# Patient Record
Sex: FEMALE | Race: WHITE
[De-identification: names, ages, dates, MRNs, and addresses within clinical notes are randomized per-mention and may not be internally consistent; named-entity substitution may affect disease eponyms.]

---

## 2018-12-20 ENCOUNTER — HOSPITAL ENCOUNTER (OUTPATIENT)
Dept: HOSPITAL 100 - SDC | Age: 28
Discharge: HOME | End: 2018-12-20
Payer: MEDICAID

## 2018-12-20 VITALS
RESPIRATION RATE: 14 BRPM | OXYGEN SATURATION: 100 % | DIASTOLIC BLOOD PRESSURE: 58 MMHG | TEMPERATURE: 100.22 F | HEART RATE: 83 BPM | SYSTOLIC BLOOD PRESSURE: 99 MMHG

## 2018-12-20 VITALS
TEMPERATURE: 97.52 F | DIASTOLIC BLOOD PRESSURE: 58 MMHG | OXYGEN SATURATION: 100 % | HEART RATE: 63 BPM | SYSTOLIC BLOOD PRESSURE: 99 MMHG | RESPIRATION RATE: 16 BRPM

## 2018-12-20 VITALS
HEART RATE: 50 BPM | TEMPERATURE: 98.6 F | DIASTOLIC BLOOD PRESSURE: 65 MMHG | OXYGEN SATURATION: 100 % | RESPIRATION RATE: 16 BRPM | SYSTOLIC BLOOD PRESSURE: 96 MMHG

## 2018-12-20 VITALS
SYSTOLIC BLOOD PRESSURE: 102 MMHG | OXYGEN SATURATION: 100 % | RESPIRATION RATE: 16 BRPM | DIASTOLIC BLOOD PRESSURE: 58 MMHG | HEART RATE: 68 BPM

## 2018-12-20 VITALS — DIASTOLIC BLOOD PRESSURE: 58 MMHG | SYSTOLIC BLOOD PRESSURE: 99 MMHG

## 2018-12-20 VITALS — BODY MASS INDEX: 22.3 KG/M2

## 2018-12-20 VITALS
OXYGEN SATURATION: 100 % | SYSTOLIC BLOOD PRESSURE: 97 MMHG | RESPIRATION RATE: 16 BRPM | HEART RATE: 54 BPM | DIASTOLIC BLOOD PRESSURE: 61 MMHG

## 2018-12-20 DIAGNOSIS — F17.200: ICD-10-CM

## 2018-12-20 DIAGNOSIS — Z3A.01: ICD-10-CM

## 2018-12-20 DIAGNOSIS — O02.1: Primary | ICD-10-CM

## 2018-12-20 DIAGNOSIS — D50.9: ICD-10-CM

## 2018-12-20 LAB
DEPRECATED RDW RBC: 40.8 FL (ref 35.1–43.9)
ERYTHROCYTE [DISTWIDTH] IN BLOOD: 12.1 % (ref 11.6–14.6)
HCT VFR BLD AUTO: 39.7 % (ref 37–47)
HEMOGLOBIN: 13.3 G/DL (ref 12–15)
HGB BLD-MCNC: 13.3 G/DL (ref 12–15)
MCV RBC: 92.8 FL (ref 81–99)
MEAN CORP HGB CONC: 33.5 G/GL (ref 32–36)
MEAN PLATELET VOL.: 11.5 FL (ref 6.2–12)
PLATELET # BLD: 140 K/MM3 (ref 150–450)
PLATELET COUNT: 140 K/MM3 (ref 150–450)
RBC # BLD AUTO: 4.28 M/MM3 (ref 4.2–5.4)
RBC DISTRIBUTION WIDTH CV: 12.1 % (ref 11.6–14.6)
RBC DISTRIBUTION WIDTH SD: 40.8 FL (ref 35.1–43.9)
SCAN INDICATED ON CBC? Y/N: NO
WBC # BLD AUTO: 5.3 K/MM3 (ref 4.4–11)
WHITE BLOOD COUNT: 5.3 K/MM3 (ref 4.4–11)

## 2018-12-20 PROCEDURE — 88305 TISSUE EXAM BY PATHOLOGIST: CPT

## 2018-12-20 PROCEDURE — 86850 RBC ANTIBODY SCREEN: CPT

## 2018-12-20 PROCEDURE — 85027 COMPLETE CBC AUTOMATED: CPT

## 2018-12-20 PROCEDURE — 86900 BLOOD TYPING SEROLOGIC ABO: CPT

## 2018-12-20 PROCEDURE — 59820 CARE OF MISCARRIAGE: CPT

## 2019-03-22 ENCOUNTER — HOSPITAL ENCOUNTER (OUTPATIENT)
Dept: HOSPITAL 100 - SDC | Age: 29
Discharge: HOME | End: 2019-03-22
Payer: MEDICAID

## 2019-03-22 VITALS
SYSTOLIC BLOOD PRESSURE: 102 MMHG | RESPIRATION RATE: 18 BRPM | OXYGEN SATURATION: 100 % | DIASTOLIC BLOOD PRESSURE: 57 MMHG | HEART RATE: 57 BPM

## 2019-03-22 VITALS
HEART RATE: 68 BPM | SYSTOLIC BLOOD PRESSURE: 102 MMHG | DIASTOLIC BLOOD PRESSURE: 68 MMHG | RESPIRATION RATE: 16 BRPM | TEMPERATURE: 98.96 F | OXYGEN SATURATION: 100 %

## 2019-03-22 VITALS
RESPIRATION RATE: 18 BRPM | OXYGEN SATURATION: 100 % | HEART RATE: 53 BPM | DIASTOLIC BLOOD PRESSURE: 68 MMHG | SYSTOLIC BLOOD PRESSURE: 102 MMHG | TEMPERATURE: 97.6 F

## 2019-03-22 VITALS
DIASTOLIC BLOOD PRESSURE: 68 MMHG | SYSTOLIC BLOOD PRESSURE: 93 MMHG | RESPIRATION RATE: 16 BRPM | TEMPERATURE: 99.14 F | OXYGEN SATURATION: 100 % | HEART RATE: 54 BPM

## 2019-03-22 VITALS
DIASTOLIC BLOOD PRESSURE: 68 MMHG | HEART RATE: 54 BPM | OXYGEN SATURATION: 100 % | SYSTOLIC BLOOD PRESSURE: 102 MMHG | TEMPERATURE: 98.96 F | RESPIRATION RATE: 16 BRPM

## 2019-03-22 VITALS
RESPIRATION RATE: 18 BRPM | DIASTOLIC BLOOD PRESSURE: 68 MMHG | HEART RATE: 59 BPM | OXYGEN SATURATION: 100 % | SYSTOLIC BLOOD PRESSURE: 102 MMHG

## 2019-03-22 VITALS — BODY MASS INDEX: 22.2 KG/M2 | BODY MASS INDEX: 22.3 KG/M2

## 2019-03-22 VITALS
DIASTOLIC BLOOD PRESSURE: 68 MMHG | RESPIRATION RATE: 18 BRPM | SYSTOLIC BLOOD PRESSURE: 98 MMHG | OXYGEN SATURATION: 100 % | HEART RATE: 55 BPM

## 2019-03-22 DIAGNOSIS — N93.9: Primary | ICD-10-CM

## 2019-03-22 DIAGNOSIS — D64.9: ICD-10-CM

## 2019-03-22 DIAGNOSIS — F17.210: ICD-10-CM

## 2019-03-22 LAB — INTERNAL QC VALIDATED?: (no result)

## 2019-03-22 PROCEDURE — 0UDB8ZZ EXTRACTION OF ENDOMETRIUM, VIA NATURAL OR ARTIFICIAL OPENING ENDOSCOPIC: ICD-10-PCS | Performed by: OBSTETRICS & GYNECOLOGY

## 2019-03-22 PROCEDURE — 88305 TISSUE EXAM BY PATHOLOGIST: CPT

## 2019-03-22 PROCEDURE — 58558 HYSTEROSCOPY BIOPSY: CPT

## 2019-03-22 PROCEDURE — 81025 URINE PREGNANCY TEST: CPT

## 2019-03-22 RX ADMIN — HYDROCODONE BITARTRATE AND ACETAMINOPHEN 0 TABLET: 5; 325 TABLET ORAL at 08:40

## 2019-10-07 ENCOUNTER — HOSPITAL ENCOUNTER (OUTPATIENT)
Age: 29
End: 2019-10-07
Payer: MEDICAID

## 2019-10-07 VITALS — BODY MASS INDEX: 22.2 KG/M2

## 2019-10-07 DIAGNOSIS — Z31.430: Primary | ICD-10-CM

## 2019-10-07 PROCEDURE — 36415 COLL VENOUS BLD VENIPUNCTURE: CPT

## 2020-05-19 ENCOUNTER — HOSPITAL ENCOUNTER (OUTPATIENT)
Dept: HOSPITAL 100 - WPOUT | Age: 30
Discharge: HOME | End: 2020-05-19
Payer: MEDICAID

## 2020-05-19 VITALS — SYSTOLIC BLOOD PRESSURE: 109 MMHG | HEART RATE: 78 BPM | TEMPERATURE: 96.98 F | DIASTOLIC BLOOD PRESSURE: 66 MMHG

## 2020-05-19 VITALS — DIASTOLIC BLOOD PRESSURE: 61 MMHG | TEMPERATURE: 96.98 F | SYSTOLIC BLOOD PRESSURE: 98 MMHG | HEART RATE: 73 BPM

## 2020-05-19 VITALS — BODY MASS INDEX: 22.2 KG/M2 | BODY MASS INDEX: 25.5 KG/M2

## 2020-05-19 VITALS — DIASTOLIC BLOOD PRESSURE: 54 MMHG | SYSTOLIC BLOOD PRESSURE: 100 MMHG | HEART RATE: 63 BPM

## 2020-05-19 VITALS — TEMPERATURE: 96.8 F

## 2020-05-19 DIAGNOSIS — O47.1: Primary | ICD-10-CM

## 2020-05-19 DIAGNOSIS — Z3A.39: ICD-10-CM

## 2020-05-19 PROCEDURE — G0378 HOSPITAL OBSERVATION PER HR: HCPCS

## 2020-05-19 PROCEDURE — 59050 FETAL MONITOR W/REPORT: CPT

## 2020-05-19 PROCEDURE — 59025 FETAL NON-STRESS TEST: CPT

## 2020-05-19 PROCEDURE — 99218: CPT

## 2020-05-23 ENCOUNTER — HOSPITAL ENCOUNTER (INPATIENT)
Age: 30
LOS: 1 days | Discharge: HOME | DRG: 560 | End: 2020-05-24
Payer: MEDICAID

## 2020-05-23 VITALS
TEMPERATURE: 98.7 F | HEART RATE: 60 BPM | SYSTOLIC BLOOD PRESSURE: 109 MMHG | OXYGEN SATURATION: 98 % | DIASTOLIC BLOOD PRESSURE: 60 MMHG

## 2020-05-23 VITALS — TEMPERATURE: 98.06 F | OXYGEN SATURATION: 99 %

## 2020-05-23 VITALS — SYSTOLIC BLOOD PRESSURE: 109 MMHG | HEART RATE: 71 BPM | DIASTOLIC BLOOD PRESSURE: 66 MMHG

## 2020-05-23 VITALS — BODY MASS INDEX: 25.5 KG/M2 | BODY MASS INDEX: 25.4 KG/M2

## 2020-05-23 VITALS — HEART RATE: 65 BPM | OXYGEN SATURATION: 99 % | DIASTOLIC BLOOD PRESSURE: 57 MMHG | SYSTOLIC BLOOD PRESSURE: 105 MMHG

## 2020-05-23 VITALS
HEART RATE: 61 BPM | OXYGEN SATURATION: 99 % | TEMPERATURE: 98.96 F | SYSTOLIC BLOOD PRESSURE: 108 MMHG | RESPIRATION RATE: 16 BRPM | DIASTOLIC BLOOD PRESSURE: 60 MMHG

## 2020-05-23 VITALS — SYSTOLIC BLOOD PRESSURE: 112 MMHG | DIASTOLIC BLOOD PRESSURE: 67 MMHG | HEART RATE: 70 BPM

## 2020-05-23 VITALS — OXYGEN SATURATION: 96 % | TEMPERATURE: 98 F

## 2020-05-23 VITALS — DIASTOLIC BLOOD PRESSURE: 65 MMHG | HEART RATE: 64 BPM | SYSTOLIC BLOOD PRESSURE: 120 MMHG

## 2020-05-23 VITALS
RESPIRATION RATE: 14 BRPM | TEMPERATURE: 97.16 F | HEART RATE: 58 BPM | SYSTOLIC BLOOD PRESSURE: 103 MMHG | DIASTOLIC BLOOD PRESSURE: 53 MMHG

## 2020-05-23 VITALS
OXYGEN SATURATION: 99 % | TEMPERATURE: 98.5 F | DIASTOLIC BLOOD PRESSURE: 55 MMHG | SYSTOLIC BLOOD PRESSURE: 99 MMHG | HEART RATE: 64 BPM | RESPIRATION RATE: 16 BRPM

## 2020-05-23 VITALS
TEMPERATURE: 98.42 F | OXYGEN SATURATION: 99 % | DIASTOLIC BLOOD PRESSURE: 60 MMHG | SYSTOLIC BLOOD PRESSURE: 102 MMHG | HEART RATE: 51 BPM

## 2020-05-23 VITALS — DIASTOLIC BLOOD PRESSURE: 61 MMHG | SYSTOLIC BLOOD PRESSURE: 107 MMHG | HEART RATE: 75 BPM

## 2020-05-23 VITALS
DIASTOLIC BLOOD PRESSURE: 56 MMHG | TEMPERATURE: 97.6 F | SYSTOLIC BLOOD PRESSURE: 94 MMHG | HEART RATE: 51 BPM | RESPIRATION RATE: 16 BRPM

## 2020-05-23 VITALS — TEMPERATURE: 98.42 F | SYSTOLIC BLOOD PRESSURE: 112 MMHG | HEART RATE: 65 BPM | DIASTOLIC BLOOD PRESSURE: 73 MMHG

## 2020-05-23 VITALS — HEART RATE: 61 BPM | SYSTOLIC BLOOD PRESSURE: 108 MMHG | DIASTOLIC BLOOD PRESSURE: 60 MMHG

## 2020-05-23 VITALS — HEART RATE: 77 BPM | DIASTOLIC BLOOD PRESSURE: 69 MMHG | SYSTOLIC BLOOD PRESSURE: 109 MMHG

## 2020-05-23 VITALS — DIASTOLIC BLOOD PRESSURE: 63 MMHG | SYSTOLIC BLOOD PRESSURE: 110 MMHG | OXYGEN SATURATION: 98 % | HEART RATE: 48 BPM

## 2020-05-23 VITALS — DIASTOLIC BLOOD PRESSURE: 74 MMHG | SYSTOLIC BLOOD PRESSURE: 118 MMHG | TEMPERATURE: 98.42 F | HEART RATE: 54 BPM

## 2020-05-23 DIAGNOSIS — F17.200: ICD-10-CM

## 2020-05-23 DIAGNOSIS — Z3A.40: ICD-10-CM

## 2020-05-23 LAB
DEPRECATED RDW RBC: 45.1 FL (ref 35.1–43.9)
DIFFERENTIAL INDICATED: (no result)
ERYTHROCYTE [DISTWIDTH] IN BLOOD: 12.5 % (ref 11.6–14.6)
HCT VFR BLD AUTO: 36.3 % (ref 37–47)
HEMOGLOBIN: 12.1 G/DL (ref 12–15)
HGB BLD-MCNC: 12.1 G/DL (ref 12–15)
IMMATURE GRANULOCYTES COUNT: 0.04 X10^3/UL (ref 0–0)
MCV RBC: 100 FL (ref 81–99)
MEAN CORP HGB CONC: 33.3 G/DL (ref 32–36)
MEAN PLATELET VOL.: 12.1 FL (ref 6.2–12)
NRBC FLAGGED BY ANALYZER: 0 % (ref 0–5)
PLATELET # BLD: 135 K/MM3 (ref 150–450)
PLATELET COUNT: 135 K/MM3 (ref 150–450)
POSITIVE COUNT: YES
RBC # BLD AUTO: 3.63 M/MM3 (ref 4.2–5.4)
RBC DISTRIBUTION WIDTH CV: 12.5 % (ref 11.6–14.6)
RBC DISTRIBUTION WIDTH SD: 45.1 FL (ref 35.1–43.9)
SCAN SMEAR PER REVIEW CRITERIA: (no result)
WBC # BLD AUTO: 7.9 K/MM3 (ref 4.4–11)
WHITE BLOOD COUNT: 7.9 K/MM3 (ref 4.4–11)

## 2020-05-23 PROCEDURE — 86901 BLOOD TYPING SEROLOGIC RH(D): CPT

## 2020-05-23 PROCEDURE — 59050 FETAL MONITOR W/REPORT: CPT

## 2020-05-23 PROCEDURE — 86900 BLOOD TYPING SEROLOGIC ABO: CPT

## 2020-05-23 PROCEDURE — 99218: CPT

## 2020-05-23 PROCEDURE — G0378 HOSPITAL OBSERVATION PER HR: HCPCS

## 2020-05-23 PROCEDURE — 85025 COMPLETE CBC W/AUTO DIFF WBC: CPT

## 2020-05-23 PROCEDURE — 59025 FETAL NON-STRESS TEST: CPT

## 2020-05-23 PROCEDURE — 86850 RBC ANTIBODY SCREEN: CPT

## 2020-05-24 VITALS
SYSTOLIC BLOOD PRESSURE: 103 MMHG | RESPIRATION RATE: 14 BRPM | DIASTOLIC BLOOD PRESSURE: 57 MMHG | TEMPERATURE: 97.1 F | HEART RATE: 50 BPM

## 2020-05-24 VITALS
DIASTOLIC BLOOD PRESSURE: 48 MMHG | RESPIRATION RATE: 16 BRPM | SYSTOLIC BLOOD PRESSURE: 90 MMHG | HEART RATE: 58 BPM | TEMPERATURE: 98 F | OXYGEN SATURATION: 100 %

## 2020-05-24 VITALS
OXYGEN SATURATION: 99 % | DIASTOLIC BLOOD PRESSURE: 54 MMHG | TEMPERATURE: 97.8 F | HEART RATE: 63 BPM | SYSTOLIC BLOOD PRESSURE: 93 MMHG | RESPIRATION RATE: 16 BRPM

## 2023-03-03 LAB
BASOPHILS (10*3/UL) IN BLOOD BY AUTOMATED COUNT: 0.05 X10E9/L (ref 0–0.1)
BASOPHILS/100 LEUKOCYTES IN BLOOD BY AUTOMATED COUNT: 0.8 % (ref 0–2)
EOSINOPHILS (10*3/UL) IN BLOOD BY AUTOMATED COUNT: 0.25 X10E9/L (ref 0–0.7)
EOSINOPHILS/100 LEUKOCYTES IN BLOOD BY AUTOMATED COUNT: 3.9 % (ref 0–6)
ERYTHROCYTE DISTRIBUTION WIDTH (RATIO) BY AUTOMATED COUNT: 11.4 % (ref 11.5–14.5)
ERYTHROCYTE MEAN CORPUSCULAR HEMOGLOBIN CONCENTRATION (G/DL) BY AUTOMATED: 32.5 G/DL (ref 32–36)
ERYTHROCYTE MEAN CORPUSCULAR VOLUME (FL) BY AUTOMATED COUNT: 96 FL (ref 80–100)
ERYTHROCYTES (10*6/UL) IN BLOOD BY AUTOMATED COUNT: 4.4 X10E12/L (ref 4–5.2)
HEMATOCRIT (%) IN BLOOD BY AUTOMATED COUNT: 42 % (ref 36–46)
HEMOGLOBIN (G/DL) IN BLOOD: 13.6 G/DL (ref 12–16)
IMMATURE GRANULOCYTES/100 LEUKOCYTES IN BLOOD BY AUTOMATED COUNT: 0.2 % (ref 0–0.9)
LEUKOCYTES (10*3/UL) IN BLOOD BY AUTOMATED COUNT: 7.2 X10E9/L (ref 4.4–11.3)
LYMPHOCYTES (10*3/UL) IN BLOOD BY AUTOMATED COUNT: 2.56 X10E9/L (ref 1.2–4.8)
LYMPHOCYTES/100 LEUKOCYTES IN BLOOD BY AUTOMATED COUNT: 39.6 % (ref 13–44)
MONOCYTES (10*3/UL) IN BLOOD BY AUTOMATED COUNT: 0.56 X10E9/L (ref 0.1–1)
MONOCYTES/100 LEUKOCYTES IN BLOOD BY AUTOMATED COUNT: 8.7 % (ref 2–10)
NEUTROPHILS (10*3/UL) IN BLOOD BY AUTOMATED COUNT: 3.03 X10E9/L (ref 1.2–7.7)
NEUTROPHILS/100 LEUKOCYTES IN BLOOD BY AUTOMATED COUNT: 46.8 % (ref 40–80)
PLATELETS (10*3/UL) IN BLOOD AUTOMATED COUNT: 211 X10E9/L (ref 150–450)

## 2023-03-07 DIAGNOSIS — F90.9 ADULT ADHD: ICD-10-CM

## 2023-03-07 RX ORDER — LISDEXAMFETAMINE DIMESYLATE CAPSULES 20 MG/1
20 CAPSULE ORAL DAILY
Qty: 30 CAPSULE | Refills: 0 | Status: SHIPPED | OUTPATIENT
Start: 2023-03-07 | End: 2023-04-06 | Stop reason: SDUPTHER

## 2023-03-07 RX ORDER — LISDEXAMFETAMINE DIMESYLATE CAPSULES 20 MG/1
20 CAPSULE ORAL DAILY
COMMUNITY
Start: 2021-10-21 | End: 2023-03-07 | Stop reason: SDUPTHER

## 2023-04-05 ENCOUNTER — TELEPHONE (OUTPATIENT)
Dept: PRIMARY CARE | Facility: CLINIC | Age: 33
End: 2023-04-05
Payer: COMMERCIAL

## 2023-04-05 DIAGNOSIS — F90.9 ADULT ADHD: ICD-10-CM

## 2023-04-06 RX ORDER — LISDEXAMFETAMINE DIMESYLATE CAPSULES 20 MG/1
20 CAPSULE ORAL DAILY
Qty: 30 CAPSULE | Refills: 0 | Status: SHIPPED | OUTPATIENT
Start: 2023-04-06 | End: 2023-05-05 | Stop reason: SDUPTHER

## 2023-05-05 ENCOUNTER — TELEPHONE (OUTPATIENT)
Dept: PRIMARY CARE | Facility: CLINIC | Age: 33
End: 2023-05-05
Payer: COMMERCIAL

## 2023-05-05 DIAGNOSIS — F90.9 ADULT ADHD: ICD-10-CM

## 2023-05-05 RX ORDER — LISDEXAMFETAMINE DIMESYLATE CAPSULES 20 MG/1
20 CAPSULE ORAL DAILY
Qty: 30 CAPSULE | Refills: 0 | Status: SHIPPED | OUTPATIENT
Start: 2023-05-05 | End: 2023-06-08 | Stop reason: SDUPTHER

## 2023-06-08 DIAGNOSIS — F90.9 ADULT ADHD: ICD-10-CM

## 2023-06-08 RX ORDER — LISDEXAMFETAMINE DIMESYLATE CAPSULES 20 MG/1
20 CAPSULE ORAL DAILY
Qty: 30 CAPSULE | Refills: 0 | Status: SHIPPED | OUTPATIENT
Start: 2023-06-08 | End: 2023-07-14 | Stop reason: SDUPTHER

## 2023-06-12 ENCOUNTER — OFFICE VISIT (OUTPATIENT)
Dept: PRIMARY CARE | Facility: CLINIC | Age: 33
End: 2023-06-12
Payer: COMMERCIAL

## 2023-06-12 VITALS
WEIGHT: 130.8 LBS | HEART RATE: 73 BPM | DIASTOLIC BLOOD PRESSURE: 80 MMHG | HEIGHT: 67 IN | BODY MASS INDEX: 20.53 KG/M2 | OXYGEN SATURATION: 100 % | SYSTOLIC BLOOD PRESSURE: 128 MMHG

## 2023-06-12 DIAGNOSIS — F90.9 ADULT ADHD: Primary | ICD-10-CM

## 2023-06-12 PROCEDURE — 99213 OFFICE O/P EST LOW 20 MIN: CPT | Performed by: FAMILY MEDICINE

## 2023-06-12 ASSESSMENT — ENCOUNTER SYMPTOMS
HEADACHES: 0
PALPITATIONS: 0

## 2023-06-12 ASSESSMENT — PATIENT HEALTH QUESTIONNAIRE - PHQ9
SUM OF ALL RESPONSES TO PHQ9 QUESTIONS 1 AND 2: 0
1. LITTLE INTEREST OR PLEASURE IN DOING THINGS: NOT AT ALL
2. FEELING DOWN, DEPRESSED OR HOPELESS: NOT AT ALL

## 2023-07-07 ENCOUNTER — OFFICE VISIT (OUTPATIENT)
Dept: PRIMARY CARE | Facility: CLINIC | Age: 33
End: 2023-07-07
Payer: COMMERCIAL

## 2023-07-07 ENCOUNTER — TELEPHONE (OUTPATIENT)
Dept: PRIMARY CARE | Facility: CLINIC | Age: 33
End: 2023-07-07

## 2023-07-07 VITALS
WEIGHT: 131 LBS | SYSTOLIC BLOOD PRESSURE: 108 MMHG | DIASTOLIC BLOOD PRESSURE: 60 MMHG | HEART RATE: 75 BPM | HEIGHT: 67 IN | BODY MASS INDEX: 20.56 KG/M2

## 2023-07-07 DIAGNOSIS — M79.672 LEFT FOOT PAIN: Primary | ICD-10-CM

## 2023-07-07 PROCEDURE — 99213 OFFICE O/P EST LOW 20 MIN: CPT

## 2023-07-07 RX ORDER — MELOXICAM 15 MG/1
15 TABLET ORAL DAILY
Qty: 30 TABLET | Refills: 0 | Status: SHIPPED | OUTPATIENT
Start: 2023-07-07 | End: 2023-08-06

## 2023-07-07 ASSESSMENT — ENCOUNTER SYMPTOMS
GASTROINTESTINAL NEGATIVE: 1
RESPIRATORY NEGATIVE: 1
CONSTITUTIONAL NEGATIVE: 1
CARDIOVASCULAR NEGATIVE: 1

## 2023-07-07 ASSESSMENT — PATIENT HEALTH QUESTIONNAIRE - PHQ9
1. LITTLE INTEREST OR PLEASURE IN DOING THINGS: NOT AT ALL
SUM OF ALL RESPONSES TO PHQ9 QUESTIONS 1 AND 2: 0
2. FEELING DOWN, DEPRESSED OR HOPELESS: NOT AT ALL

## 2023-07-07 NOTE — PROGRESS NOTES
"Subjective   Patient ID: Ness Courtney is a 33 y.o. female who presents for pt has brusing , swelling ,pain left foot .    HPI   L FOOT PAIN: 3 months ago fell down stairs and injured great toe which has healed. Was kayaking 2 days ago and noticed the next day had bruising and pain to plantar aspect of base of 5th toe, mild bruising to this area as well. Pain is exacerbated by ambulating. Denies any injury/trauma to this area recently. She was applying pressure with her foot to base of kayak for extended periods of time. Has been taking otc ibuprofen with minimal effect.    Review of Systems   Constitutional: Negative.    Respiratory: Negative.     Cardiovascular: Negative.    Gastrointestinal: Negative.        Objective   /60   Pulse 75   Ht 1.702 m (5' 7\")   Wt 59.4 kg (131 lb)   BMI 20.52 kg/m²     Physical Exam  Constitutional:       General: She is not in acute distress.     Appearance: Normal appearance. She is not ill-appearing.   HENT:      Head: Normocephalic and atraumatic.   Eyes:      Extraocular Movements: Extraocular movements intact.      Conjunctiva/sclera: Conjunctivae normal.   Cardiovascular:      Rate and Rhythm: Normal rate.   Pulmonary:      Effort: Pulmonary effort is normal.   Abdominal:      General: There is no distension.   Musculoskeletal:         General: Normal range of motion.      Cervical back: Normal range of motion.      Comments: L foot good cap refill, good pulses, bruising/tenderness noted to base of 5th toe, ROM normal   Skin:     General: Skin is warm and dry.   Neurological:      General: No focal deficit present.      Mental Status: She is alert and oriented to person, place, and time.   Psychiatric:         Mood and Affect: Mood normal.         Behavior: Behavior normal.         Thought Content: Thought content normal.         Judgment: Judgment normal.         Assessment/Plan        Xray L foot today  Rx meloxicam  Order given for short walking boot  Will call " with Xray results and discuss plan further

## 2023-07-10 ENCOUNTER — TELEPHONE (OUTPATIENT)
Dept: PRIMARY CARE | Facility: CLINIC | Age: 33
End: 2023-07-10
Payer: COMMERCIAL

## 2023-07-10 NOTE — TELEPHONE ENCOUNTER
----- Message from Chacorta Kidd PA-C sent at 7/10/2023  7:35 AM EDT -----  Please let her know her Xray looked fine, nothing abnormal is seen. Hopefully her foot is starting to feel better, if it is not getting better/or getting worse within the week I think we should refer to ortho. Thanks!

## 2023-07-14 DIAGNOSIS — F90.9 ADULT ADHD: ICD-10-CM

## 2023-07-14 RX ORDER — LISDEXAMFETAMINE DIMESYLATE CAPSULES 20 MG/1
20 CAPSULE ORAL DAILY
Qty: 30 CAPSULE | Refills: 0 | Status: SHIPPED | OUTPATIENT
Start: 2023-07-14 | End: 2023-08-15 | Stop reason: SDUPTHER

## 2023-08-15 DIAGNOSIS — F90.9 ADULT ADHD: ICD-10-CM

## 2023-08-15 RX ORDER — LISDEXAMFETAMINE DIMESYLATE CAPSULES 20 MG/1
20 CAPSULE ORAL DAILY
Qty: 30 CAPSULE | Refills: 0 | Status: SHIPPED | OUTPATIENT
Start: 2023-08-15 | End: 2023-09-15 | Stop reason: SDUPTHER

## 2023-09-15 DIAGNOSIS — F90.9 ADULT ADHD: ICD-10-CM

## 2023-09-15 RX ORDER — LISDEXAMFETAMINE DIMESYLATE CAPSULES 20 MG/1
20 CAPSULE ORAL DAILY
Qty: 30 CAPSULE | Refills: 0 | Status: SHIPPED | OUTPATIENT
Start: 2023-09-15 | End: 2023-10-23 | Stop reason: SDUPTHER

## 2023-10-23 ENCOUNTER — TELEPHONE (OUTPATIENT)
Dept: PRIMARY CARE | Facility: CLINIC | Age: 33
End: 2023-10-23
Payer: COMMERCIAL

## 2023-10-23 DIAGNOSIS — F90.9 ADULT ADHD: ICD-10-CM

## 2023-10-23 RX ORDER — LISDEXAMFETAMINE DIMESYLATE CAPSULES 20 MG/1
20 CAPSULE ORAL DAILY
Qty: 30 CAPSULE | Refills: 0 | Status: SHIPPED | OUTPATIENT
Start: 2023-10-23 | End: 2023-12-11 | Stop reason: SDUPTHER

## 2023-11-06 ENCOUNTER — TELEPHONE (OUTPATIENT)
Dept: PRIMARY CARE | Facility: CLINIC | Age: 33
End: 2023-11-06
Payer: COMMERCIAL

## 2023-12-04 ENCOUNTER — TELEPHONE (OUTPATIENT)
Dept: PRIMARY CARE | Facility: CLINIC | Age: 33
End: 2023-12-04
Payer: COMMERCIAL

## 2023-12-04 DIAGNOSIS — F90.9 ADULT ADHD: ICD-10-CM

## 2023-12-04 RX ORDER — LISDEXAMFETAMINE DIMESYLATE CAPSULES 20 MG/1
20 CAPSULE ORAL DAILY
Qty: 30 CAPSULE | Refills: 0 | Status: CANCELLED | OUTPATIENT
Start: 2023-12-04 | End: 2024-01-03

## 2023-12-04 NOTE — TELEPHONE ENCOUNTER
lisdexamfetamine (Vyvanse) 20 mg capsule [[id number]]      Order Details    Dose: 20 mg Route: oral Frequency: Daily   Dispense Quantity: 30 capsule Refills: 0          Sig: Take 1 capsule (20 mg) by mouth once daily.         Start Date: 10/23/23 End Date: 23 after 30 doses   Written Date: 10/23/23 Rx Expiration Date: 23    Earliest Fill Date: 10/23/23     [facility] IS HER PHARMACY, HER PHONE NUMBER [phone]. THANK YOU.

## 2023-12-11 ENCOUNTER — OFFICE VISIT (OUTPATIENT)
Dept: PRIMARY CARE | Facility: CLINIC | Age: 33
End: 2023-12-11
Payer: COMMERCIAL

## 2023-12-11 VITALS
OXYGEN SATURATION: 98 % | HEART RATE: 69 BPM | WEIGHT: 131 LBS | BODY MASS INDEX: 20.52 KG/M2 | DIASTOLIC BLOOD PRESSURE: 64 MMHG | SYSTOLIC BLOOD PRESSURE: 96 MMHG | TEMPERATURE: 96.9 F

## 2023-12-11 DIAGNOSIS — E01.0 THYROMEGALY: ICD-10-CM

## 2023-12-11 DIAGNOSIS — F90.9 ADULT ADHD: ICD-10-CM

## 2023-12-11 DIAGNOSIS — Z51.81 MEDICATION MONITORING ENCOUNTER: Primary | ICD-10-CM

## 2023-12-11 PROCEDURE — 80361 OPIATES 1 OR MORE: CPT

## 2023-12-11 PROCEDURE — 80346 BENZODIAZEPINES1-12: CPT

## 2023-12-11 PROCEDURE — 80368 SEDATIVE HYPNOTICS: CPT

## 2023-12-11 PROCEDURE — 80365 DRUG SCREENING OXYCODONE: CPT

## 2023-12-11 PROCEDURE — 80307 DRUG TEST PRSMV CHEM ANLYZR: CPT

## 2023-12-11 PROCEDURE — 99214 OFFICE O/P EST MOD 30 MIN: CPT | Performed by: FAMILY MEDICINE

## 2023-12-11 PROCEDURE — 80358 DRUG SCREENING METHADONE: CPT

## 2023-12-11 PROCEDURE — 82570 ASSAY OF URINE CREATININE: CPT

## 2023-12-11 PROCEDURE — 80373 DRUG SCREENING TRAMADOL: CPT

## 2023-12-11 PROCEDURE — 80354 DRUG SCREENING FENTANYL: CPT

## 2023-12-11 RX ORDER — LISDEXAMFETAMINE DIMESYLATE CAPSULES 20 MG/1
20 CAPSULE ORAL DAILY
Qty: 30 CAPSULE | Refills: 0 | Status: SHIPPED | OUTPATIENT
Start: 2023-12-11 | End: 2024-01-10 | Stop reason: SDUPTHER

## 2023-12-11 RX ORDER — FLUTICASONE PROPIONATE 50 MCG
1 SPRAY, SUSPENSION (ML) NASAL DAILY
COMMUNITY
Start: 2023-11-25

## 2023-12-11 NOTE — PROGRESS NOTES
Subjective   Patient ID: Ness Courtney is a 33 y.o. female who presents for ADHD (6 mo. /Med refill - Vyvanse) and Follow-up (Prior ear infection, went to [facility], prescribed cefdnir).    ADHD       OARRS:  José Manuel Galarza MD on 12/11//2023  3:47 PM  I have personally reviewed the OARRS report for Ness Courtney. I have considered the risks of abuse, dependence, addiction and diversion     Is the patient prescribed a combination of a benzodiazepine and opioid?  no, I feel it is clincially indicated to continue the medication and have discussed with the patient risks/benefits/alternatives.     Last Urine Drug Screen / ordered today: yes    Results are as expected.   Last dose was 1 week ago due to shortage.     Controlled Substance Agreement:  Date of the Last Agreement: June 12, 2023  Reviewed Controlled Substance Agreement including but not limited to the benefits, risks, and alternatives to treatment with a Controlled Substance medication(s).     Stimulants:   What is the patient's goal of therapy? To remain focus during the day and get tasks done in timely manner   Is this being achieved with current treatment? Yes      Activities of Daily Living:   Is your overall impression that this patient is benefiting (symptom reduction outweighs side effects) from stimulant therapy? Yes        Less scattered on the medication   Less sluggish   Focused better   More organized   Working [employer]     SE none no cp no insominia       Sees benign hem for Idiopathic thrombocytopenia     Review of Systems    Had  BOM on thanksgiving was on atbs and flonase   Objective   BP 96/64 (BP Location: Left arm, Patient Position: Sitting)   Pulse 69   Temp 36.1 °C (96.9 °F)   Wt 59.4 kg (131 lb)   SpO2 98%   BMI 20.52 kg/m²     Physical Exam  Vitals reviewed.   Constitutional:       Appearance: Normal appearance.   HENT:      Head: Normocephalic and atraumatic.   Eyes:      Conjunctiva/sclera: Conjunctivae normal.   Neck:      Comments:  Thyroid diffusely enlarged   Cardiovascular:      Rate and Rhythm: Normal rate and regular rhythm.   Pulmonary:      Effort: Pulmonary effort is normal.      Breath sounds: Normal breath sounds.   Musculoskeletal:      Cervical back: Neck supple.   Skin:     General: Skin is warm and dry.   Neurological:      General: No focal deficit present.      Mental Status: She is alert and oriented to person, place, and time.   Psychiatric:         Mood and Affect: Mood normal.         Behavior: Behavior normal.         Thought Content: Thought content normal.         Judgment: Judgment normal.         Assessment/Plan   Diagnoses and all orders for this visit:  Medication monitoring encounter  -     Opiate/Opioid/Benzo Prescription Compliance  Adult ADHD  -     lisdexamfetamine (Vyvanse) 20 mg capsule; Take 1 capsule (20 mg) by mouth once daily.  Thyromegaly  -     US thyroid; Future

## 2023-12-12 ENCOUNTER — HOSPITAL ENCOUNTER (OUTPATIENT)
Dept: RADIOLOGY | Facility: HOSPITAL | Age: 33
Discharge: HOME | End: 2023-12-12
Payer: COMMERCIAL

## 2023-12-12 DIAGNOSIS — E01.0 THYROMEGALY: ICD-10-CM

## 2023-12-12 LAB
AMPHETAMINES UR QL SCN: NORMAL
BARBITURATES UR QL SCN: NORMAL
BZE UR QL SCN: NORMAL
CANNABINOIDS UR QL SCN: NORMAL
CREAT UR-MCNC: 71.1 MG/DL (ref 20–320)
PCP UR QL SCN: NORMAL

## 2023-12-12 PROCEDURE — 76536 US EXAM OF HEAD AND NECK: CPT

## 2023-12-12 PROCEDURE — 76536 US EXAM OF HEAD AND NECK: CPT | Performed by: RADIOLOGY

## 2024-01-10 DIAGNOSIS — F90.9 ADULT ADHD: ICD-10-CM

## 2024-01-10 NOTE — TELEPHONE ENCOUNTER
Medication Refill Request    Medication:  vyvanse     Pharmacy:  [facility]    Last OV:  12/11/23  Upcoming appointment:  06/10/24    ORDER PENDED

## 2024-01-11 RX ORDER — LISDEXAMFETAMINE DIMESYLATE CAPSULES 20 MG/1
20 CAPSULE ORAL DAILY
Qty: 30 CAPSULE | Refills: 0 | Status: SHIPPED | OUTPATIENT
Start: 2024-01-11 | End: 2024-02-12 | Stop reason: SDUPTHER

## 2024-02-12 DIAGNOSIS — F90.9 ADULT ADHD: ICD-10-CM

## 2024-02-12 RX ORDER — LISDEXAMFETAMINE DIMESYLATE CAPSULES 20 MG/1
20 CAPSULE ORAL DAILY
Qty: 30 CAPSULE | Refills: 0 | Status: SHIPPED | OUTPATIENT
Start: 2024-02-12 | End: 2024-03-14 | Stop reason: SDUPTHER

## 2024-02-12 NOTE — TELEPHONE ENCOUNTER
Medication Refill Request    Medication:  vyvanse    Pharmacy:  [facility]    Last OV:  12/11/23  Upcoming appointment:  6/10/24    ORDER PENDED

## 2024-03-14 ENCOUNTER — LAB (OUTPATIENT)
Dept: LAB | Facility: LAB | Age: 34
End: 2024-03-14
Payer: COMMERCIAL

## 2024-03-14 DIAGNOSIS — F90.9 ADULT ADHD: ICD-10-CM

## 2024-03-14 DIAGNOSIS — D69.6 THROMBOCYTOPENIA (CMS-HCC): ICD-10-CM

## 2024-03-14 LAB
ALBUMIN SERPL BCP-MCNC: 4.6 G/DL (ref 3.4–5)
ALP SERPL-CCNC: 14 U/L (ref 33–110)
ALT SERPL W P-5'-P-CCNC: 14 U/L (ref 7–45)
ANION GAP SERPL CALC-SCNC: 9 MMOL/L (ref 10–20)
APTT PPP: 31 SECONDS (ref 27–38)
AST SERPL W P-5'-P-CCNC: 15 U/L (ref 9–39)
BILIRUB SERPL-MCNC: 0.3 MG/DL (ref 0–1.2)
BUN SERPL-MCNC: 18 MG/DL (ref 6–23)
CALCIUM SERPL-MCNC: 9.1 MG/DL (ref 8.6–10.3)
CHLORIDE SERPL-SCNC: 104 MMOL/L (ref 98–107)
CO2 SERPL-SCNC: 30 MMOL/L (ref 21–32)
CREAT SERPL-MCNC: 0.74 MG/DL (ref 0.5–1.05)
EGFRCR SERPLBLD CKD-EPI 2021: >90 ML/MIN/1.73M*2
FERRITIN SERPL-MCNC: 73 NG/ML (ref 8–150)
GLUCOSE SERPL-MCNC: 97 MG/DL (ref 74–99)
INR PPP: 1.1 (ref 0.9–1.1)
IRON SATN MFR SERPL: 37 % (ref 25–45)
IRON SERPL-MCNC: 105 UG/DL (ref 35–150)
POTASSIUM SERPL-SCNC: 3.4 MMOL/L (ref 3.5–5.3)
PROT SERPL-MCNC: 6.3 G/DL (ref 6.4–8.2)
PROTHROMBIN TIME: 12.6 SECONDS (ref 9.8–12.8)
SODIUM SERPL-SCNC: 140 MMOL/L (ref 136–145)
TIBC SERPL-MCNC: 281 UG/DL (ref 240–445)
UIBC SERPL-MCNC: 176 UG/DL (ref 110–370)
VIT B12 SERPL-MCNC: 320 PG/ML (ref 211–911)

## 2024-03-14 PROCEDURE — 85610 PROTHROMBIN TIME: CPT

## 2024-03-14 PROCEDURE — 36415 COLL VENOUS BLD VENIPUNCTURE: CPT

## 2024-03-14 PROCEDURE — 80053 COMPREHEN METABOLIC PANEL: CPT

## 2024-03-14 PROCEDURE — 85385 FIBRINOGEN ANTIGEN: CPT

## 2024-03-14 PROCEDURE — 85730 THROMBOPLASTIN TIME PARTIAL: CPT

## 2024-03-14 PROCEDURE — 83550 IRON BINDING TEST: CPT

## 2024-03-14 PROCEDURE — 82607 VITAMIN B-12: CPT

## 2024-03-14 PROCEDURE — 83540 ASSAY OF IRON: CPT

## 2024-03-14 PROCEDURE — 82728 ASSAY OF FERRITIN: CPT

## 2024-03-14 RX ORDER — LISDEXAMFETAMINE DIMESYLATE CAPSULES 20 MG/1
20 CAPSULE ORAL DAILY
Qty: 30 CAPSULE | Refills: 0 | Status: SHIPPED | OUTPATIENT
Start: 2024-03-14 | End: 2024-04-15 | Stop reason: SDUPTHER

## 2024-03-14 NOTE — TELEPHONE ENCOUNTER
Medication Refill Request    Medication:  lisdexamfetamine    Pharmacy:  [facility]    Last OV:  12/11/23  Upcoming appointment:  6/10/24    ORDER PENDED

## 2024-03-18 ENCOUNTER — OFFICE VISIT (OUTPATIENT)
Dept: HEMATOLOGY/ONCOLOGY | Facility: CLINIC | Age: 34
End: 2024-03-18
Payer: COMMERCIAL

## 2024-03-18 VITALS
OXYGEN SATURATION: 99 % | HEART RATE: 78 BPM | SYSTOLIC BLOOD PRESSURE: 104 MMHG | DIASTOLIC BLOOD PRESSURE: 68 MMHG | RESPIRATION RATE: 18 BRPM | HEIGHT: 67 IN | BODY MASS INDEX: 21.47 KG/M2 | WEIGHT: 136.8 LBS | TEMPERATURE: 96.4 F

## 2024-03-18 DIAGNOSIS — D69.6 THROMBOCYTOPENIA (CMS-HCC): Primary | ICD-10-CM

## 2024-03-18 DIAGNOSIS — E87.6 HYPOKALEMIA: ICD-10-CM

## 2024-03-18 LAB
BASOPHILS # BLD AUTO: 0.02 X10*3/UL (ref 0–0.1)
BASOPHILS NFR BLD AUTO: 0.5 %
CRP SERPL-MCNC: 0.1 MG/DL
EOSINOPHIL # BLD AUTO: 0.17 X10*3/UL (ref 0–0.7)
EOSINOPHIL NFR BLD AUTO: 3.9 %
ERYTHROCYTE [DISTWIDTH] IN BLOOD BY AUTOMATED COUNT: 11.6 % (ref 11.5–14.5)
ERYTHROCYTE [SEDIMENTATION RATE] IN BLOOD BY WESTERGREN METHOD: 3 MM/H (ref 0–20)
HCT VFR BLD AUTO: 40.1 % (ref 36–46)
HGB BLD-MCNC: 13.3 G/DL (ref 12–16)
IMM GRANULOCYTES # BLD AUTO: 0.01 X10*3/UL (ref 0–0.7)
IMM GRANULOCYTES NFR BLD AUTO: 0.2 % (ref 0–0.9)
LYMPHOCYTES # BLD AUTO: 1.65 X10*3/UL (ref 1.2–4.8)
LYMPHOCYTES NFR BLD AUTO: 38 %
MCH RBC QN AUTO: 31.5 PG (ref 26–34)
MCHC RBC AUTO-ENTMCNC: 33.2 G/DL (ref 32–36)
MCV RBC AUTO: 95 FL (ref 80–100)
MONOCYTES # BLD AUTO: 0.29 X10*3/UL (ref 0.1–1)
MONOCYTES NFR BLD AUTO: 6.7 %
NEUTROPHILS # BLD AUTO: 2.2 X10*3/UL (ref 1.2–7.7)
NEUTROPHILS NFR BLD AUTO: 50.7 %
NRBC BLD-RTO: 0 /100 WBCS (ref 0–0)
PLATELET # BLD AUTO: 164 X10*3/UL (ref 150–450)
RBC # BLD AUTO: 4.22 X10*6/UL (ref 4–5.2)
TSH SERPL-ACNC: 0.56 MIU/L (ref 0.44–3.98)
WBC # BLD AUTO: 4.3 X10*3/UL (ref 4.4–11.3)

## 2024-03-18 PROCEDURE — 99213 OFFICE O/P EST LOW 20 MIN: CPT

## 2024-03-18 PROCEDURE — 36415 COLL VENOUS BLD VENIPUNCTURE: CPT

## 2024-03-18 PROCEDURE — 86038 ANTINUCLEAR ANTIBODIES: CPT | Mod: SAMLAB

## 2024-03-18 PROCEDURE — 84443 ASSAY THYROID STIM HORMONE: CPT

## 2024-03-18 PROCEDURE — 86140 C-REACTIVE PROTEIN: CPT

## 2024-03-18 PROCEDURE — 85025 COMPLETE CBC W/AUTO DIFF WBC: CPT

## 2024-03-18 PROCEDURE — 85652 RBC SED RATE AUTOMATED: CPT

## 2024-03-18 RX ORDER — POTASSIUM CHLORIDE 20 MEQ/1
20 TABLET, EXTENDED RELEASE ORAL DAILY
Qty: 5 TABLET | Refills: 0 | Status: SHIPPED | OUTPATIENT
Start: 2024-03-18

## 2024-03-18 ASSESSMENT — PAIN SCALES - GENERAL: PAINLEVEL: 0-NO PAIN

## 2024-03-18 NOTE — PROGRESS NOTES
Patient ID: Isai Courtney is a 33 y.o. female.    Subjective    HPI    History of Present Illness:      ID Statement:    ISAI COURTNEY is a 33 year old Female        Chief Complaint: Evaluation for thrombocytopenia   Interval History:    Referred by Dr. Galarza      Telehealth Visit due to COVID19 national emergency and related precautions     Reason for referral: thrombocytopenia      HPI  31 year old woman with hx of ADHD, IBS, MVP, smoking, allergic rhinitis referred for thrombocytopenia. Cbc on 11/23/21 showed isolated  tcp with plts of 87K, normal hg and normal wbc     she was first noted to have thrombocytopenia in march 2019   she had a molar pregnancy and D&C , her plts count seems to have recovered after that and was back to normal in April 2019      she has no hx of bleeding episodes, no hematochezia or melena   bruises easily sometimes especially on bumping      she has an IUD in placed and has light menstrual bleeding     she has hx of early pregnancy losses x 2 and one molar pregnancy and 2 live births      no other surgery   she has no hx of bleeding with dental work   rare epistaxis   some fatigue , b/l lower spine pain, follows PCP for same and had xrays recently   otherwise feels well and has no nausea, vomiting, breathing problems , fever or infections      interval history - 3/18/24     Energy is decent, staying active  Increased sweating, but yet chilled at the same  Pain fingers/toes- bilateral hot and swollen toes   Weight gain  Diarrhea a few weeks ago  Appetite is good, eating well   Tinges of blood when blowing nose, no nose bleeds  Bruises easily   No abnormal bleeding events   2 months ago pinky toe severely bruised,  at this time  Does bruise easy  Minimal joint pain in knees  No rashes or skin itching   No skin changes   No fever, or infections   No urinary issues   No new concerns today       PAST MEDICAL HISTORY:   ADHD, IBS, MVP, smoking, allergic rhinitis       SOCIAL  "HISTORY:   currently enrolled in college   smokes < 1 ppd x 10-15 years   drinks 2 beers 3-5 nights per week , more recent      FAMILY HISTORY:    maternal grandmother pancreatic ca   grandfather had a heart condition   No other specific history of bleeding, clotting or malignant disorder in the family.     REVIEW OF SYSTEMS:  Pertinent finding as per the history above.  There are no additional specific symptoms pertaining to eyes, ENT, hematologic, lymphatic, neurological, psychiatric, cardiac,  pulmonary, GI, , endocrine, rheumatic, dermatological, or musculoskeletal systems.  All other systems have been reviewed and generally negative and noncontributory.  Objective    BSA: 1.71 meters squared  /68 (BP Location: Right arm, Patient Position: Sitting, BP Cuff Size: Adult)   Pulse 78   Temp 35.8 °C (96.4 °F)   Resp 18   Ht 1.702 m (5' 7.01\")   Wt 62.1 kg (136 lb 12.8 oz)   SpO2 99%   BMI 21.42 kg/m²      Physical Exam    Performance Status:  Asymptomatic      Assessment/Plan      Assessment and Plan:   Assessment:    1. Thrombocytopenia   differential diagnosis and work up discussed :   -nutritional deficiencies: check b12 and folic acid   -alcohol induced: recommended abstinence   -rule out liver disease/HSM: check US abd   -infectious : hep and hiv   -primary or secondary autoimmune: LUCY  -check thyroid function   -smear and coags      her work up all returned with normal or negative values and a negative US of the abdomen, with exception of her fibrinogen that was low, we discussed  the significance and differential diagnosis, level is not extremely low and does not have any bleeding  or symptoms suggestive of DIC, but unclear etiology      the repeat labs on 4/27/22 are generally stable with a stable plts count near 86K and a slightly improved fibrinogen now t 187, no new clinical concerns, we discussed that in the event of uncontrolled bleeding with her dental work we will do amicar mouth  rinse " but otherwise will continue to monitor cbc , leading differential at this time would be ITP     9/22/22- her platelets have dropped but with stable wbc and Hg   paradoxically she was recently on a steroid course for poison ivy and the plts dropped rather than improving if it was itp   at this time will continue to monitor as she is asymptomatic and consider further testing and/or treatment if further drop      11/2/22- the recent cbc showed complete normalization of the platelets   it may be secondary to an immune etiology , primary or secondary, her LUCY is also positive   at this time we will hold off on any additional work up and continue cbc monitoring     3/6/23: CBC remains stable. recent platelets remains corrected at 211k. Patient is asymptomatic, No fevers, recurrent infections, no abnormal bleeding or bruising issues. Over all she feeling well today. no new clinical concerns. Discussed will continue  to monitor labs.     3/18/24: Patient is feeling well today. Denies any major bleeding events. Bruises easily. Minimal Joint pain in knees. Increased sweating, but chilled at the same time. Pain in fingers/toes-fingers turn white, toes turn bright red and are swollen, they looked bruised for a period of times, now they are a different color all the time.    CBC remains stable/corrected. Platelets (164k), Hgb (13.3- WBC trended down slightly to 4.3. Pt/INR APTT and Fibrinogen are stable. Will recheck an LUCY and inflammatory markers. Will continue to monitor labs at this time with no intervention needed as patient is asymptomatic.     Reviewed remaining labs 3/20/24:  LUCY-Positive- discussed referral to Rheumatology for further evalutaion  Inflammatory markers- CRP (0.10), Sed Rate (3)  TSH- 0.56     2. hx of pregnancy losses and molar pregnancy   APLA and hcg are negative      3. hypofibrinogenemia   the etiology is not very clear,   the work up showed normal coags and normal LFTs, no evidence of liver disease    will order fibrinogen antigen in addition to the clottable assay , check thrombin time   ?congenital      11/2/22- the work up is essentially normal, the repeat levels of fib and the fib Ag are both normal now      3/6/23- Fib Ag remains corrected and stable. will continue to monitor labs     9/14/23- Labs not completed- will repeat at next appt.      Plan   Labs today- reviewed  Referral to Rheumatology for positive LUCY   RTC in 6 months with labs prior    (CBC w/diff, CMP, Fib Ag, APTT, Pt/Inr, Ferritin, Iron Studies, B12)        Valeria Jameson, BRENNEN-CNP

## 2024-03-19 LAB
ANA PATTERN: ABNORMAL
ANA SER QL HEP2 SUBST: POSITIVE
ANA TITR SER IF: ABNORMAL {TITER}
FIBRINOGEN AG PPP IA-MCNC: 258 MG/DL (ref 149–353)

## 2024-03-26 ENCOUNTER — TELEPHONE (OUTPATIENT)
Dept: HEMATOLOGY/ONCOLOGY | Facility: CLINIC | Age: 34
End: 2024-03-26
Payer: COMMERCIAL

## 2024-03-26 DIAGNOSIS — D69.6 THROMBOCYTOPENIA (CMS-HCC): ICD-10-CM

## 2024-04-11 ENCOUNTER — TELEPHONE (OUTPATIENT)
Dept: HEMATOLOGY/ONCOLOGY | Facility: CLINIC | Age: 34
End: 2024-04-11
Payer: COMMERCIAL

## 2024-04-15 DIAGNOSIS — F90.9 ADULT ADHD: ICD-10-CM

## 2024-04-15 RX ORDER — LISDEXAMFETAMINE DIMESYLATE CAPSULES 20 MG/1
20 CAPSULE ORAL DAILY
Qty: 30 CAPSULE | Refills: 0 | Status: SHIPPED | OUTPATIENT
Start: 2024-04-15 | End: 2024-05-22 | Stop reason: SDUPTHER

## 2024-04-23 ENCOUNTER — OFFICE VISIT (OUTPATIENT)
Dept: URGENT CARE | Facility: CLINIC | Age: 34
End: 2024-04-23
Payer: COMMERCIAL

## 2024-04-23 VITALS
OXYGEN SATURATION: 98 % | WEIGHT: 132 LBS | DIASTOLIC BLOOD PRESSURE: 66 MMHG | HEIGHT: 67 IN | BODY MASS INDEX: 20.72 KG/M2 | TEMPERATURE: 99.3 F | RESPIRATION RATE: 18 BRPM | HEART RATE: 74 BPM | SYSTOLIC BLOOD PRESSURE: 103 MMHG

## 2024-04-23 DIAGNOSIS — J20.9 ACUTE BRONCHITIS, UNSPECIFIED ORGANISM: Primary | ICD-10-CM

## 2024-04-23 LAB — POC GROUP A STREP, PCR: NOT DETECTED

## 2024-04-23 PROCEDURE — 87651 STREP A DNA AMP PROBE: CPT | Mod: QW | Performed by: NURSE PRACTITIONER

## 2024-04-23 PROCEDURE — 99213 OFFICE O/P EST LOW 20 MIN: CPT | Performed by: NURSE PRACTITIONER

## 2024-04-23 RX ORDER — AZITHROMYCIN 250 MG/1
TABLET, FILM COATED ORAL
Qty: 6 TABLET | Refills: 0 | Status: SHIPPED | OUTPATIENT
Start: 2024-04-23 | End: 2024-04-28

## 2024-04-23 RX ORDER — PREDNISONE 10 MG/1
TABLET ORAL DAILY
Qty: 21 TABLET | Refills: 0 | Status: SHIPPED | OUTPATIENT
Start: 2024-04-23 | End: 2024-04-28

## 2024-04-23 RX ORDER — ALBUTEROL SULFATE 90 UG/1
2 AEROSOL, METERED RESPIRATORY (INHALATION) EVERY 6 HOURS PRN
Qty: 18 G | Refills: 0 | Status: SHIPPED | OUTPATIENT
Start: 2024-04-23 | End: 2025-04-23

## 2024-04-23 NOTE — LETTER
April 23, 2024     Patient: Ness Courtney   YOB: 1990   Date of Visit: 4/23/2024       To Whom It May Concern:    Ness Courtney was seen in my clinic on 4/23/2024 at 11:40 am. Please excuse Ness for her absence from work on this day to make the appointment.    If you have any questions or concerns, please don't hesitate to call.         Sincerely,         Luan Jalloh, BRENNEN-CNP        CC: No Recipients

## 2024-04-23 NOTE — PROGRESS NOTES
33 y.o. female presents for evaluation of URI.  Symptoms including cough, congestion, body aches, malaise, and headache have been present for several days and refractory to OTC meds.  No fever, chills, nausea, vomiting, abdominal pain, CP, or SOB.  No exacerbating factors. No known COVID 19/flu exposure.      Vitals:    04/23/24 1139   BP: 103/66   Pulse: 74   Resp: 18   Temp: 37.4 °C (99.3 °F)   SpO2: 98%       No Known Allergies    Medication Documentation Review Audit       Reviewed by Pita Jolly MA (Medical Assistant) on 04/23/24 at 1139      Medication Order Taking? Sig Documenting Provider Last Dose Status   fluticasone (Flonase) 50 mcg/actuation nasal spray [id number] Yes Administer 1 spray into each nostril once daily. José Manuel Galarza MD Taking Active   levonorgestrel (Mirena) 21 mcg/24 hours (8 yrs) 52 mg IUD [id number] Yes 52 mg by intrauterine route 1 time. Eliza Allen MD, DDS Taking Active   lisdexamfetamine (Vyvanse) 20 mg capsule [id number] Yes Take 1 capsule (20 mg) by mouth once daily. José Manuel Galarza MD Taking Active   potassium chloride CR (Klor-Con M20) 20 mEq ER tablet [id number] No Take 1 tablet (20 mEq) by mouth once daily. Do not crush or chew.   Patient not taking: Reported on 4/23/2024    BRENNEN Radford-CNP Not Taking Active                    No past medical history on file.    Past Surgical History:   Procedure Laterality Date    OTHER SURGICAL HISTORY  06/18/2020    Colonoscopy    OTHER SURGICAL HISTORY  06/18/2020    Endoscopy    OTHER SURGICAL HISTORY  06/18/2020    Oral surgery    OTHER SURGICAL HISTORY  06/19/2020    Dilation and curettage       ROS  See HPI    Physical Exam  Vitals and nursing note reviewed.   Constitutional:       General: She is not in acute distress.     Appearance: Normal appearance. She is normal weight. She is not ill-appearing or toxic-appearing.   HENT:      Head: Normocephalic and atraumatic.      Right Ear: Tympanic membrane and ear canal  normal.      Left Ear: Tympanic membrane and ear canal normal.      Nose: Congestion present.      Mouth/Throat:      Mouth: Mucous membranes are moist.      Pharynx: Posterior oropharyngeal erythema (mild) present.      Comments: No tonsillar edema  Eyes:      Extraocular Movements: Extraocular movements intact.      Conjunctiva/sclera: Conjunctivae normal.      Pupils: Pupils are equal, round, and reactive to light.   Cardiovascular:      Rate and Rhythm: Normal rate and regular rhythm.   Pulmonary:      Effort: Pulmonary effort is normal.      Breath sounds: Wheezing (LLL) present.   Lymphadenopathy:      Cervical: No cervical adenopathy.   Skin:     General: Skin is warm and dry.   Neurological:      General: No focal deficit present.      Mental Status: She is alert and oriented to person, place, and time.   Psychiatric:         Mood and Affect: Mood normal.         Behavior: Behavior normal.       Recent Results (from the past 2 hour(s))   POCT Group A Streptococcus, PCR manually resulted    Collection Time: 04/23/24 12:10 PM   Result Value Ref Range    POC Group A Strep, PCR Not Detected Not Detected       Assessment/Plan/MDM  Ness was seen today for sore throat.  Diagnoses and all orders for this visit:  Acute bronchitis, unspecified organism (Primary)  -     POCT Group A Streptococcus, PCR manually resulted  -     azithromycin (Zithromax Z-Tong) 250 mg tablet; Take 2 tablets (500 mg) on  Day 1,  followed by 1 tablet (250 mg) once daily on Days 2 through 5.  -     predniSONE (Deltasone) 10 mg tablet; Take 6 tablets (60 mg) by mouth once daily for 1 day, THEN 5 tablets (50 mg) once daily for 1 day, THEN 4 tablets (40 mg) once daily for 1 day, THEN 3 tablets (30 mg) once daily for 1 day, THEN 2 tablets (20 mg) once daily for 1 day, THEN 1 tablet (10 mg) once daily for 1 day.  -     albuterol 90 mcg/actuation inhaler; Inhale 2 puffs every 6 hours if needed for wheezing.    Encouraged pt to continue otc cold  remedies PRN, push by mouth fluids and rest. Patient's clinical presentation is otherwise unremarkable at this time. Patient is discharged with instructions to follow-up with primary care or seek emergency medical attention for worsening symptoms or any new concerns.      I did personally review Ness's past medical history, surgical history, social history, as well as family history (when relevant).  In this case, I also oversaw the her drug management by reviewing her medication list, allergy list, as well as the medications that I prescribed during the UC course and/or recommended as an out-patient (including possible OTC medications such as acetaminophen, NSAIDs , etc).    After reviewing the items above, I did not look at previous medical documentation, such as recent hospitalizations, office visits, and/or recent consultations with PCP/specialist.                          SDOH:   Another factor that I considered in Ness's care was her Social Determinants of Health (SDOH). During this UC encounter, she did not have social determinants of health. Those SDOH influencing Ness's care are: none      Luan Jalloh CNP  [facility]  [phone]

## 2024-05-22 ENCOUNTER — TELEPHONE (OUTPATIENT)
Dept: PRIMARY CARE | Facility: CLINIC | Age: 34
End: 2024-05-22
Payer: COMMERCIAL

## 2024-05-22 DIAGNOSIS — F90.9 ADULT ADHD: ICD-10-CM

## 2024-05-22 RX ORDER — LISDEXAMFETAMINE DIMESYLATE CAPSULES 20 MG/1
20 CAPSULE ORAL DAILY
Qty: 30 CAPSULE | Refills: 0 | Status: SHIPPED | OUTPATIENT
Start: 2024-05-22 | End: 2024-06-21

## 2024-05-22 NOTE — TELEPHONE ENCOUNTER
Patient called in and would like a refill on the following medication....... lisdexamfetamine (Vyvanse) 20 mg capsule called into [facility] pharmacy

## 2024-06-10 ENCOUNTER — APPOINTMENT (OUTPATIENT)
Dept: PRIMARY CARE | Facility: CLINIC | Age: 34
End: 2024-06-10
Payer: COMMERCIAL

## 2024-08-05 ENCOUNTER — APPOINTMENT (OUTPATIENT)
Dept: PRIMARY CARE | Facility: CLINIC | Age: 34
End: 2024-08-05
Payer: COMMERCIAL

## 2024-09-16 ENCOUNTER — LAB (OUTPATIENT)
Dept: LAB | Facility: LAB | Age: 34
End: 2024-09-16
Payer: COMMERCIAL

## 2024-09-16 DIAGNOSIS — E87.6 HYPOKALEMIA: ICD-10-CM

## 2024-09-16 DIAGNOSIS — D69.6 THROMBOCYTOPENIA (CMS-HCC): ICD-10-CM

## 2024-09-16 LAB
ALBUMIN SERPL BCP-MCNC: 4.7 G/DL (ref 3.4–5)
ALP SERPL-CCNC: 12 U/L (ref 33–110)
ALT SERPL W P-5'-P-CCNC: 17 U/L (ref 7–45)
ANION GAP SERPL CALC-SCNC: 12 MMOL/L (ref 10–20)
AST SERPL W P-5'-P-CCNC: 17 U/L (ref 9–39)
BASOPHILS # BLD AUTO: 0.03 X10*3/UL (ref 0–0.1)
BASOPHILS NFR BLD AUTO: 0.6 %
BILIRUB SERPL-MCNC: 0.5 MG/DL (ref 0–1.2)
BUN SERPL-MCNC: 14 MG/DL (ref 6–23)
CALCIUM SERPL-MCNC: 9.2 MG/DL (ref 8.6–10.3)
CHLORIDE SERPL-SCNC: 105 MMOL/L (ref 98–107)
CO2 SERPL-SCNC: 28 MMOL/L (ref 21–32)
CREAT SERPL-MCNC: 0.83 MG/DL (ref 0.5–1.05)
EGFRCR SERPLBLD CKD-EPI 2021: >90 ML/MIN/1.73M*2
EOSINOPHIL # BLD AUTO: 0.17 X10*3/UL (ref 0–0.7)
EOSINOPHIL NFR BLD AUTO: 3.5 %
ERYTHROCYTE [DISTWIDTH] IN BLOOD BY AUTOMATED COUNT: 11.6 % (ref 11.5–14.5)
GLUCOSE SERPL-MCNC: 88 MG/DL (ref 74–99)
HCT VFR BLD AUTO: 39.8 % (ref 36–46)
HGB BLD-MCNC: 12.9 G/DL (ref 12–16)
IMM GRANULOCYTES # BLD AUTO: 0.01 X10*3/UL (ref 0–0.7)
IMM GRANULOCYTES NFR BLD AUTO: 0.2 % (ref 0–0.9)
LYMPHOCYTES # BLD AUTO: 1.9 X10*3/UL (ref 1.2–4.8)
LYMPHOCYTES NFR BLD AUTO: 39.5 %
MCH RBC QN AUTO: 31.4 PG (ref 26–34)
MCHC RBC AUTO-ENTMCNC: 32.4 G/DL (ref 32–36)
MCV RBC AUTO: 97 FL (ref 80–100)
MONOCYTES # BLD AUTO: 0.43 X10*3/UL (ref 0.1–1)
MONOCYTES NFR BLD AUTO: 8.9 %
NEUTROPHILS # BLD AUTO: 2.27 X10*3/UL (ref 1.2–7.7)
NEUTROPHILS NFR BLD AUTO: 47.3 %
NRBC BLD-RTO: 0 /100 WBCS (ref 0–0)
PLATELET # BLD AUTO: 182 X10*3/UL (ref 150–450)
POTASSIUM SERPL-SCNC: 3.8 MMOL/L (ref 3.5–5.3)
PROT SERPL-MCNC: 6.9 G/DL (ref 6.4–8.2)
RBC # BLD AUTO: 4.11 X10*6/UL (ref 4–5.2)
SODIUM SERPL-SCNC: 141 MMOL/L (ref 136–145)
WBC # BLD AUTO: 4.8 X10*3/UL (ref 4.4–11.3)

## 2024-09-16 PROCEDURE — 85025 COMPLETE CBC W/AUTO DIFF WBC: CPT

## 2024-09-16 PROCEDURE — 36415 COLL VENOUS BLD VENIPUNCTURE: CPT

## 2024-09-16 PROCEDURE — 80053 COMPREHEN METABOLIC PANEL: CPT

## 2024-09-16 PROCEDURE — 85730 THROMBOPLASTIN TIME PARTIAL: CPT

## 2024-09-16 PROCEDURE — 85385 FIBRINOGEN ANTIGEN: CPT

## 2024-09-16 PROCEDURE — 85610 PROTHROMBIN TIME: CPT

## 2024-09-17 LAB
APTT PPP: 31 SECONDS (ref 27–38)
INR PPP: 1.2 (ref 0.9–1.1)
PROTHROMBIN TIME: 13.9 SECONDS (ref 9.8–12.8)

## 2024-09-20 LAB — FIBRINOGEN AG PPP IA-MCNC: 195 MG/DL (ref 149–353)

## 2024-09-26 ENCOUNTER — OFFICE VISIT (OUTPATIENT)
Dept: HEMATOLOGY/ONCOLOGY | Facility: CLINIC | Age: 34
End: 2024-09-26
Payer: COMMERCIAL

## 2024-09-26 VITALS
SYSTOLIC BLOOD PRESSURE: 103 MMHG | TEMPERATURE: 96.6 F | OXYGEN SATURATION: 99 % | HEART RATE: 67 BPM | RESPIRATION RATE: 16 BRPM | DIASTOLIC BLOOD PRESSURE: 68 MMHG | BODY MASS INDEX: 20.82 KG/M2 | WEIGHT: 132.94 LBS

## 2024-09-26 DIAGNOSIS — D69.6 THROMBOCYTOPENIA (CMS-HCC): ICD-10-CM

## 2024-09-26 PROCEDURE — 99213 OFFICE O/P EST LOW 20 MIN: CPT

## 2024-09-26 ASSESSMENT — PAIN SCALES - GENERAL: PAINLEVEL: 0-NO PAIN

## 2024-09-26 NOTE — PROGRESS NOTES
Patient ID: Isai Courtney is a 34 y.o. female.    Subjective    HPI    HPI     History of Present Illness:      ID Statement:    ISAI COURTNEY is a 33 year old Female        Chief Complaint: Evaluation for thrombocytopenia   Interval History:    Referred by Dr. Galarza      Telehealth Visit due to COVID19 national emergency and related precautions     Reason for referral: thrombocytopenia      HPI  31 year old woman with hx of ADHD, IBS, MVP, smoking, allergic rhinitis referred for thrombocytopenia. Cbc on 11/23/21 showed isolated  tcp with plts of 87K, normal hg and normal wbc     she was first noted to have thrombocytopenia in march 2019   she had a molar pregnancy and D&C , her plts count seems to have recovered after that and was back to normal in April 2019      she has no hx of bleeding episodes, no hematochezia or melena   bruises easily sometimes especially on bumping      she has an IUD in placed and has light menstrual bleeding     she has hx of early pregnancy losses x 2 and one molar pregnancy and 2 live births      no other surgery   she has no hx of bleeding with dental work   rare epistaxis   some fatigue , b/l lower spine pain, follows PCP for same and had xrays recently   otherwise feels well and has no nausea, vomiting, breathing problems , fever or infections      interval history - 9/26/24     Energy is decent, staying active  Decreased sweating, always cold  No pain in fingers/toes- bilateral hot and swollen toes   Increased headaches, and GI issues, possibly due to stress  Weight gain  Appetite is good, eating well   Denies any abnormal bleeding or bruising   Chronic Lower back pain/ joint pain in knees  No rashes or skin itching   No skin changes   No fever, or infections   No urinary issues   No new concerns today        PAST MEDICAL HISTORY:   ADHD, IBS, MVP, smoking, allergic rhinitis       SOCIAL HISTORY:   currently enrolled in college   smokes < 1 ppd x 10-15 years   drinks 2 beers  3-5 nights per week , more recent      FAMILY HISTORY:    maternal grandmother pancreatic ca   grandfather had a heart condition   No other specific history of bleeding, clotting or malignant disorder in the family.     REVIEW OF SYSTEMS:  Pertinent finding as per the history above.  There are no additional specific symptoms pertaining to eyes, ENT, hematologic, lymphatic, neurological, psychiatric, cardiac,  pulmonary, GI, , endocrine, rheumatic, dermatological, or musculoskeletal systems.  All other systems have been reviewed and generally negative and noncontributory.     Objective    BSA: There is no height or weight on file to calculate BSA.  There were no vitals taken for this visit.     Physical Exam  Vitals reviewed.   Constitutional:       Appearance: Normal appearance.   HENT:      Head: Normocephalic and atraumatic.      Nose: Nose normal.      Mouth/Throat:      Mouth: Mucous membranes are moist.      Pharynx: Oropharynx is clear.   Eyes:      General: No scleral icterus.     Extraocular Movements: Extraocular movements intact.      Conjunctiva/sclera: Conjunctivae normal.   Cardiovascular:      Rate and Rhythm: Normal rate and regular rhythm.      Pulses: Normal pulses.      Heart sounds: Normal heart sounds.   Pulmonary:      Breath sounds: Normal breath sounds.   Abdominal:      Palpations: Abdomen is soft.      Tenderness: There is no abdominal tenderness.   Musculoskeletal:         General: Normal range of motion.      Cervical back: Normal range of motion.   Skin:     General: Skin is warm and dry.   Neurological:      General: No focal deficit present.      Mental Status: She is alert and oriented to person, place, and time.   Psychiatric:         Mood and Affect: Mood normal.         Behavior: Behavior normal.         Thought Content: Thought content normal.         Judgment: Judgment normal.       Performance Status:  Asymptomatic      Assessment/Plan   1. Thrombocytopenia   differential  diagnosis and work up discussed :   -nutritional deficiencies: check b12 and folic acid   -alcohol induced: recommended abstinence   -rule out liver disease/HSM: check US abd   -infectious : hep and hiv   -primary or secondary autoimmune: LUCY  -check thyroid function   -smear and coags      her work up all returned with normal or negative values and a negative US of the abdomen, with exception of her fibrinogen that was low, we discussed  the significance and differential diagnosis, level is not extremely low and does not have any bleeding  or symptoms suggestive of DIC, but unclear etiology      the repeat labs on 4/27/22 are generally stable with a stable plts count near 86K and a slightly improved fibrinogen now t 187, no new clinical concerns, we discussed that in the event of uncontrolled bleeding with her dental work we will do amicar mouth  rinse but otherwise will continue to monitor cbc , leading differential at this time would be ITP     9/22/22- her platelets have dropped but with stable wbc and Hg   paradoxically she was recently on a steroid course for poison ivy and the plts dropped rather than improving if it was itp   at this time will continue to monitor as she is asymptomatic and consider further testing and/or treatment if further drop      11/2/22- the recent cbc showed complete normalization of the platelets   it may be secondary to an immune etiology , primary or secondary, her LUCY is also positive   at this time we will hold off on any additional work up and continue cbc monitoring     3/6/23: CBC remains stable. recent platelets remains corrected at 211k. Patient is asymptomatic, No fevers, recurrent infections, no abnormal bleeding or bruising issues. Over all she feeling well today. no new clinical concerns. Discussed will continue  to monitor labs.     3/18/24: Patient is feeling well today. Denies any major bleeding events. Bruises easily. Minimal Joint pain in knees. Increased sweating,  but chilled at the same time. Pain in fingers/toes-fingers turn white, toes turn bright red and are swollen, they looked bruised for a period of times, now they are a different color all the time.     CBC remains stable/corrected. Platelets (164k), Hgb (13.3- WBC trended down slightly to 4.3. Pt/INR APTT and Fibrinogen are stable. Will recheck an LUCY and inflammatory markers. Will continue to monitor labs at this time with no intervention needed as patient is asymptomatic.      Reviewed remaining labs 3/20/24:  LUCY-Positive- discussed referral to Rheumatology for further evalutaion  Inflammatory markers- CRP (0.10), Sed Rate (3)  TSH- 0.56    9/26/24: Patient is office today for routine follow-up. Overall feeling well today. Increase stress due to recent loss of a close friend/co-worker. Denies any bleeding or bruising issues. Menstrual cycles are light in nature. Appetite and Energy are decent, unchanged. Recently seen by Rheumatology and work-up for connective tissue disease, per patient all results were normal, scheduled to follow-up in 6 months. She reports decreased pain/numbness/tingling/edema in toes/feet at this time, tend to happen more during the colder months. Denies any GI or Urinary issues. Patient reports drinking 1-2 beers nightly. Educated patient that consumption of alcohol can decrease platelet activity.     Platelets remain stable at 182k, remainder of CBC is stable. Patient is asymptomatic at this time. Discussed continuation of managing care with PCP every 6-12 months would be reasonable at this time. However, in any event I am more than happy to see patient if counts should trend down, or patient has concerns.     2. hx of pregnancy losses and molar pregnancy   APLA and hcg are negative      3. hypofibrinogenemia   the etiology is not very clear,   the work up showed normal coags and normal LFTs, no evidence of liver disease   will order fibrinogen antigen in addition to the clottable assay ,  check thrombin time   ?congenital      11/2/22- the work up is essentially normal, the repeat levels of fib and the fib Ag are both normal now      3/6/23- Fib Ag remains corrected and stable. will continue to monitor labs     9/14/23- Labs not completed- will repeat at next appt.      Plan   Does not need to continue to follow with me at this time but am more than happy to see the patient in the future if needed.             Valeria Jameson, BRENNEN-CNP

## 2024-09-26 NOTE — PATIENT INSTRUCTIONS
Does not need to continue to follow with me at this time but am more than happy to see the patient in the future if needed.

## 2025-04-09 ENCOUNTER — APPOINTMENT (OUTPATIENT)
Dept: PRIMARY CARE | Facility: CLINIC | Age: 35
End: 2025-04-09